# Patient Record
(demographics unavailable — no encounter records)

---

## 2024-12-26 NOTE — DISCUSSION/SUMMARY
[FreeTextEntry1] : 37 y/o F, , s/p  , c/b PP PECwSF. Also reports history of hyperthyroidism in pregnancy.   #PECwSF BP controlled Continue procardia Discussed parameters to titrate medication as tolerated Maintain ambulatory BP log and bring to next visit  Patient was counseled on increased risk of recurrent PEC, cHTN, and CVD. She was advised to follow a heart healthy diet and aim for 150 mins of moderate intensity exercise per week. Check TTE to assess baseline LV function   #Obesity in pregnancy  BMI 33 Diet, lifestyle modifications discussed [EKG obtained to assist in diagnosis and management of assessed problem(s)] : EKG obtained to assist in diagnosis and management of assessed problem(s)

## 2024-12-26 NOTE — HISTORY OF PRESENT ILLNESS
[FreeTextEntry1] : 37 y/o F, , s/p  , c/b PP PECwSF. Also reports history of hyperthyroidism in pregnancy. She was initially on procardia 60mg, which was increased to 90mg <1 week ago. She also reports PEC with her 1st pregnancy, 2nd was uncomplicated. She is compliant with medications, monitors ambulatory BP and reports it is ~130/80. She denies HA, blurry vision, CP, dyspnea, palpitations, dizziness, lightheadedness, or LE edema.   Family Hx: denies premature CAD or sudden cardiac death Social Hx: denies smoking, ETOH, illicit drug use